# Patient Record
Sex: MALE | Race: WHITE | NOT HISPANIC OR LATINO | Employment: OTHER | URBAN - METROPOLITAN AREA
[De-identification: names, ages, dates, MRNs, and addresses within clinical notes are randomized per-mention and may not be internally consistent; named-entity substitution may affect disease eponyms.]

---

## 2017-05-04 PROBLEM — R60.0 BILATERAL LOWER EXTREMITY EDEMA: Status: ACTIVE | Noted: 2017-05-04

## 2017-05-26 DIAGNOSIS — M25.571 ACUTE RIGHT ANKLE PAIN: Primary | ICD-10-CM

## 2017-05-30 ENCOUNTER — OFFICE VISIT (OUTPATIENT)
Dept: ORTHOPEDICS | Facility: CLINIC | Age: 72
End: 2017-05-30
Payer: MEDICARE

## 2017-05-30 ENCOUNTER — HOSPITAL ENCOUNTER (OUTPATIENT)
Dept: RADIOLOGY | Facility: HOSPITAL | Age: 72
Discharge: HOME | End: 2017-05-30
Attending: ORTHOPAEDIC SURGERY | Admitting: ORTHOPAEDIC SURGERY
Payer: MEDICARE

## 2017-05-30 ENCOUNTER — HOSPITAL ENCOUNTER (OUTPATIENT)
Dept: RADIOLOGY | Facility: HOSPITAL | Age: 72
Discharge: HOME | End: 2017-05-30
Attending: ORTHOPAEDIC SURGERY
Payer: MEDICARE

## 2017-05-30 VITALS
SYSTOLIC BLOOD PRESSURE: 135 MMHG | BODY MASS INDEX: 29.4 KG/M2 | HEART RATE: 69 BPM | WEIGHT: 210 LBS | DIASTOLIC BLOOD PRESSURE: 70 MMHG | HEIGHT: 71 IN

## 2017-05-30 DIAGNOSIS — M25.572 ACUTE LEFT ANKLE PAIN: ICD-10-CM

## 2017-05-30 DIAGNOSIS — M19.079 ANKLE ARTHRITIS: ICD-10-CM

## 2017-05-30 DIAGNOSIS — M19.071 OSTEOARTHRITIS OF RIGHT SUBTALAR JOINT: Primary | ICD-10-CM

## 2017-05-30 DIAGNOSIS — M25.571 ACUTE RIGHT ANKLE PAIN: ICD-10-CM

## 2017-05-30 DIAGNOSIS — M25.572 ACUTE LEFT ANKLE PAIN: Primary | ICD-10-CM

## 2017-05-30 PROCEDURE — 99212 OFFICE O/P EST SF 10 MIN: CPT | Mod: PBBFAC,25,PO | Performed by: ORTHOPAEDIC SURGERY

## 2017-05-30 PROCEDURE — 20600 DRAIN/INJ JOINT/BURSA W/O US: CPT | Mod: PBBFAC,PO | Performed by: ORTHOPAEDIC SURGERY

## 2017-05-30 PROCEDURE — 73610 X-RAY EXAM OF ANKLE: CPT | Mod: 26,RT,, | Performed by: RADIOLOGY

## 2017-05-30 PROCEDURE — 73610 X-RAY EXAM OF ANKLE: CPT | Mod: 26,LT,, | Performed by: RADIOLOGY

## 2017-05-30 PROCEDURE — 99204 OFFICE O/P NEW MOD 45 MIN: CPT | Mod: S$PBB,25,, | Performed by: ORTHOPAEDIC SURGERY

## 2017-05-30 PROCEDURE — 99999 PR PBB SHADOW E&M-EST. PATIENT-LVL II: CPT | Mod: PBBFAC,,, | Performed by: ORTHOPAEDIC SURGERY

## 2017-05-30 PROCEDURE — 20605 DRAIN/INJ JOINT/BURSA W/O US: CPT | Mod: PBBFAC,PO | Performed by: ORTHOPAEDIC SURGERY

## 2017-05-30 RX ORDER — TRIAMCINOLONE ACETONIDE 40 MG/ML
40 INJECTION, SUSPENSION INTRA-ARTICULAR; INTRAMUSCULAR
Status: DISCONTINUED | OUTPATIENT
Start: 2017-05-30 | End: 2017-05-30 | Stop reason: HOSPADM

## 2017-05-30 RX ADMIN — TRIAMCINOLONE ACETONIDE 40 MG: 40 INJECTION, SUSPENSION INTRA-ARTICULAR; INTRAMUSCULAR at 08:05

## 2017-05-30 NOTE — LETTER
May 30, 2017      Barry Hurst MD  [address] [address]  [address]  [facility] [address]           [facility]  [address] [facility] [address] [place] [address]  Phone: [phone]          Patient: Adam Cabrera   MR Number: [id number]   YOB: 1945   Date of Visit: 5/30/2017       Dear Dr. Barry Hurst:    Thank you for referring Adam Cabrera to me for evaluation. Attached you will find relevant portions of my assessment and plan of care.    If you have questions, please do not hesitate to call me. I look forward to following Adam Cabrera along with you.    Sincerely,    Alexander Kohler MD    Enclosure  CC:  No Recipients    If you would like to receive this communication electronically, please contact [email or web address] or [phone] to request more information on [facility] access.    For providers and/or their staff who would like to refer a patient to [facility], please contact us through our one-stop-shop provider referral line, [facility], at [phone].    If you feel you have received this communication in error or would no longer like to receive these types of communications, please e-mail [email or web address]

## 2017-05-31 NOTE — PROCEDURES
Intermediate Joint Aspiration/Injection  Date/Time: 5/30/2017 8:34 PM  Performed by: KODY BAKER  Authorized by: KODY BAKER     Consent Done?:  Yes (Verbal)  Indications:  Pain and joint swelling  Site marked: The procedure site was marked      Location:  Ankle  Site:  R ankle  Prep: Patient was prepped and draped in usual sterile fashion    Ultrasonic Guidance for needle placement: No  Needle size:  22 G  Approach:  Anteromedial  Medications:  40 mg triamcinolone acetonide 40 mg/mL  Patient tolerance:  Patient tolerated the procedure well with no immediate complications

## 2017-05-31 NOTE — PROCEDURES
Small Joint Aspiration/Injection  Date/Time: 5/30/2017 8:32 PM  Performed by: KODY BAKER  Authorized by: KODY BAKER     Consent Done?:  Yes (Verbal)  Indications:  Pain  Site marked: The procedure site was marked      Location:  Foot  Site:  R subtalar  Prep: Patient was prepped and draped in usual sterile fashion    Needle size:  22 G  Medications:  40 mg triamcinolone acetonide 40 mg/mL  Patient tolerance:  Patient tolerated the procedure well with no immediate complications

## 2017-05-31 NOTE — PROGRESS NOTES
"HPI: Adam Cabrera is a 71 y.o. male who was referred to me by Dr. Barry Hurst and was seen in consultation today for  Bilateral ankle pain.  No history of injury or trauma. The pain started about 2 years ago and has been getting worse. He had an MRI done on the right that I reviewed which showed mild  degenerative changes of the ankle and hindfoot as well as some tendonitis of the achilles. He had an injection done in the ankle that didn't help. 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     PAST MEDICAL/SURGICAL/FAMILY/SOCIAL/ HISTORY: REVIEWED    ALLERGIES/MEDICATIONS: REVIEWED       Review of Systems:     Constitution: Negative.   HEENT: Negative.   Eyes: Negative.   Cardiovascular: Negative.   Respiratory: Negative.   Endocrine: Negative.   Hematologic/Lymphatic: Negative.   Skin: Negative.   Musculoskeletal: Positive for bilateral ankle pain   Gastrointestinal: Negative.   Genitourinary: Negative.   Neurological: Negative.   Psychiatric/Behavioral: Negative.   Allergic/Immunologic: Negative.       PHYSICAL EXAM:  Vitals:    05/30/17 0942   BP: 135/70   Pulse: 69     Ht Readings from Last 1 Encounters:   05/30/17 5' 11" (1.803 m)     Wt Readings from Last 1 Encounters:   05/30/17 95.3 kg (210 lb)         GENERAL: Well developed, well nourished, no acute distress.  SKIN: Skin is intact. No atrophy, abrasions or lesions are noted.   Neurological: Normal mental status. Appropriate and conversant. Alert and oriented x 3.  GAIT: Walks with an antalgic gait.    Right lower extremity:  1+ dorsalis pedis pulse.  Capillary refill < 3 seconds.  Very limited range of motion tibiotalar and subtalar joints. Normal alignment of the forefoot and the hindfoot.  5/5 strength EHL, FHL, tibialis anterior, 1/5 gastrocsoleus, 5/5 tibialis posterior and peroneals. Sensation to light touch intact sural, saphenous, superficial peroneal and deep peroneal nerves. " No swelling, ecchymosis or deformity. No lymphadenopathy, no masses or tumors palpated.   tenderness to palpation at the lateral ankle and the subtalar joint.     Left lower extremity: 1+ dorsalis pedis pulse.  Capillary refill < 3 seconds.  Very limited range of motion tibiotalar and subtalar joints. Normal alignment of the forefoot and the hindfoot.  5/5 strength EHL, FHL, tibialis anterior, 1/5 gastrocsoleus,5/5  tibialis posterior and peroneals. Sensation to light touch intact sural, saphenous, superficial peroneal and deep peroneal nerves. No swelling, ecchymosis or deformity. No lymphadenopathy, no masses or tumors palpated.   tenderness to palpation at the lateral ankle and the subtalar joint.       XRAYS:   3 views of bilateral ankles  reviewed today reveal mild  degenerative changes.       ASSESSMENT:        Encounter Diagnoses   Name Primary?    Osteoarthritis of right subtalar joint Yes    Ankle arthritis         PLAN:  I spent 20 minutes in consulation with the patient today. More than half the time was spent counseling the patient on his condition and the options for operative versus non-operative care.  I injected the ankle and subtalar joints today. I agree with Dr. Hurst and I think most of this pain is due to peripheral neuropathy from his back. If injections help I can repeat injections every 4-6 months or prn as needed.    If not I referred him to see Dr. Mon or Dr. Koenig for his neck and back.

## 2017-06-05 ENCOUNTER — TELEPHONE (OUTPATIENT)
Dept: UROLOGY | Facility: CLINIC | Age: 72
End: 2017-06-05

## 2017-06-05 NOTE — TELEPHONE ENCOUNTER
----- Message from Tia Luna sent at 6/5/2017 10:15 AM CDT -----  Contact: wife/Dorothy/[phone]  Patients wife states that she need to know if patient need to schedule an appointment stating that he seen him in his old location.  Call Dorothy at [phone].

## 2017-06-05 NOTE — TELEPHONE ENCOUNTER
Left message to let pt know he is due for an annual visit at the end of July and he is established.

## 2017-07-24 ENCOUNTER — OFFICE VISIT (OUTPATIENT)
Dept: UROLOGY | Facility: CLINIC | Age: 72
End: 2017-07-24
Payer: MEDICARE

## 2017-07-24 ENCOUNTER — LAB VISIT (OUTPATIENT)
Dept: LAB | Facility: HOSPITAL | Age: 72
End: 2017-07-24
Attending: UROLOGY
Payer: MEDICARE

## 2017-07-24 VITALS
BODY MASS INDEX: 28.67 KG/M2 | SYSTOLIC BLOOD PRESSURE: 144 MMHG | WEIGHT: 204.81 LBS | HEART RATE: 68 BPM | HEIGHT: 71 IN | DIASTOLIC BLOOD PRESSURE: 69 MMHG

## 2017-07-24 DIAGNOSIS — N40.1 HYPERTROPHY OF PROSTATE WITH URINARY OBSTRUCTION AND OTHER LOWER URINARY TRACT SYMPTOMS (LUTS): Primary | ICD-10-CM

## 2017-07-24 DIAGNOSIS — Z12.5 SCREENING FOR PROSTATE CANCER: ICD-10-CM

## 2017-07-24 DIAGNOSIS — N13.8 HYPERTROPHY OF PROSTATE WITH URINARY OBSTRUCTION AND OTHER LOWER URINARY TRACT SYMPTOMS (LUTS): Primary | ICD-10-CM

## 2017-07-24 LAB
BILIRUB SERPL-MCNC: NORMAL MG/DL
BLOOD URINE, POC: NORMAL
COLOR, POC UA: YELLOW
COMPLEXED PSA SERPL-MCNC: 0.73 NG/ML
GLUCOSE UR QL STRIP: NORMAL
KETONES UR QL STRIP: NORMAL
LEUKOCYTE ESTERASE URINE, POC: NORMAL
NITRITE, POC UA: NORMAL
PH, POC UA: 5
PROTEIN, POC: NORMAL
SPECIFIC GRAVITY, POC UA: 1.02
UROBILINOGEN, POC UA: NORMAL

## 2017-07-24 PROCEDURE — 99214 OFFICE O/P EST MOD 30 MIN: CPT | Mod: S$PBB,,, | Performed by: UROLOGY

## 2017-07-24 PROCEDURE — 99999 PR PBB SHADOW E&M-EST. PATIENT-LVL III: CPT | Mod: PBBFAC,,, | Performed by: UROLOGY

## 2017-07-24 PROCEDURE — 36415 COLL VENOUS BLD VENIPUNCTURE: CPT | Mod: PO

## 2017-07-24 PROCEDURE — 1125F AMNT PAIN NOTED PAIN PRSNT: CPT | Mod: ,,, | Performed by: UROLOGY

## 2017-07-24 PROCEDURE — 84153 ASSAY OF PSA TOTAL: CPT

## 2017-07-24 PROCEDURE — 1159F MED LIST DOCD IN RCRD: CPT | Mod: ,,, | Performed by: UROLOGY

## 2017-07-24 RX ORDER — TAMSULOSIN HYDROCHLORIDE 0.4 MG/1
0.4 CAPSULE ORAL DAILY
Qty: 30 CAPSULE | Refills: 11 | Status: ON HOLD | OUTPATIENT
Start: 2017-07-24 | End: 2019-05-02 | Stop reason: ALTCHOICE

## 2017-07-24 RX ORDER — PREGABALIN 50 MG/1
50 CAPSULE ORAL 2 TIMES DAILY
COMMUNITY
Start: 2017-07-19 | End: 2018-07-26

## 2017-07-24 RX ORDER — LATANOPROST 50 UG/ML
1 SOLUTION/ DROPS OPHTHALMIC NIGHTLY
COMMUNITY

## 2017-07-24 RX ORDER — FINASTERIDE 5 MG/1
5 TABLET, FILM COATED ORAL DAILY
Qty: 30 TABLET | Refills: 12 | Status: SHIPPED | OUTPATIENT
Start: 2017-07-24 | End: 2017-09-13

## 2017-07-24 NOTE — PROGRESS NOTES
Subjective:       Patient ID: Adam Cabrera is a 72 y.o. male.    Chief Complaint: Follow-up    HPI     72 year old with ED likely due to vascular disease. He is taking Levitra and this is less effective than it was last year.  Now has SL Ntg.  He is not interested in other treatment options.  His most recent PSA is 0.6 and unchanged from previous year.  His urinary symptoms have gotten worse over the last year.  He stopped taking Flomax and Finasteride for unknown reason.  He has has worsening back pain and neuropathy in the last year.  He complains of hesitancy, frequency and nocturia.  He was on testosterone replacement for a while but he had minimal improvement in his symptoms and testosterone has been held. He had MI 2013.   Urine dipstick shows negative for all components.      Review of Systems   Constitutional: Negative for fever.   Genitourinary: Negative for dysuria and hematuria.       Objective:      Physical Exam   Constitutional: He is oriented to person, place, and time. He appears well-developed and well-nourished.   HENT:   Head: Normocephalic and atraumatic.   Eyes: Conjunctivae are normal.   Cardiovascular: Normal rate.    Pulmonary/Chest: Effort normal.   Genitourinary: Rectal exam shows no mass and anal tone normal. Prostate is enlarged (40g s/s/a). Prostate is not tender.   Musculoskeletal: Normal range of motion.   Neurological: He is alert and oriented to person, place, and time.   Skin: Skin is warm and dry. No rash noted.   Psychiatric: He has a normal mood and affect.   Vitals reviewed.      Assessment:       1. Hypertrophy of prostate with urinary obstruction and other lower urinary tract symptoms (LUTS)    2. Screening for prostate cancer        Plan:       Hypertrophy of prostate with urinary obstruction and other lower urinary tract symptoms (LUTS)  -     POCT URINE DIPSTICK WITHOUT MICROSCOPE  -     tamsulosin (FLOMAX) 0.4 mg Cp24; Take 1 capsule (0.4 mg total) by mouth once  daily.  Dispense: 30 capsule; Refill: 11  -     finasteride (PROSCAR) 5 mg tablet; Take 1 tablet (5 mg total) by mouth once daily.  Dispense: 30 tablet; Refill: 12    Screening for prostate cancer  -     PSA, Screening; Future; Expected date: 07/24/2017      Resume Flomax and Finasteride.  RTC 12 months

## 2017-07-25 ENCOUNTER — TELEPHONE (OUTPATIENT)
Dept: UROLOGY | Facility: CLINIC | Age: 72
End: 2017-07-25

## 2017-07-25 NOTE — TELEPHONE ENCOUNTER
----- Message from KATHRYN Thompson MD sent at 7/25/2017  4:25 PM CDT -----  Call with PSA result.  Very good.

## 2017-09-13 PROBLEM — I20.0 UNSTABLE ANGINA PECTORIS: Status: ACTIVE | Noted: 2017-09-13

## 2017-09-18 PROBLEM — J41.0 SIMPLE CHRONIC BRONCHITIS: Status: ACTIVE | Noted: 2017-09-18

## 2017-09-18 PROBLEM — F51.01 PRIMARY INSOMNIA: Status: ACTIVE | Noted: 2017-09-18

## 2017-11-22 PROBLEM — I25.84 CORONARY ARTERY DISEASE DUE TO CALCIFIED CORONARY LESION: Status: ACTIVE | Noted: 2017-11-22

## 2017-11-22 PROBLEM — I25.10 CORONARY ARTERY DISEASE DUE TO CALCIFIED CORONARY LESION: Status: ACTIVE | Noted: 2017-11-22

## 2018-07-26 ENCOUNTER — OFFICE VISIT (OUTPATIENT)
Dept: UROLOGY | Facility: CLINIC | Age: 73
End: 2018-07-26
Payer: MEDICARE

## 2018-07-26 ENCOUNTER — LAB VISIT (OUTPATIENT)
Dept: LAB | Facility: HOSPITAL | Age: 73
End: 2018-07-26
Attending: UROLOGY
Payer: MEDICARE

## 2018-07-26 VITALS
SYSTOLIC BLOOD PRESSURE: 142 MMHG | HEART RATE: 75 BPM | HEIGHT: 71 IN | DIASTOLIC BLOOD PRESSURE: 69 MMHG | WEIGHT: 213.38 LBS | BODY MASS INDEX: 29.87 KG/M2

## 2018-07-26 DIAGNOSIS — N40.1 BENIGN PROSTATIC HYPERPLASIA WITH LOWER URINARY TRACT SYMPTOMS, SYMPTOM DETAILS UNSPECIFIED: Primary | ICD-10-CM

## 2018-07-26 DIAGNOSIS — Z12.5 SCREENING FOR PROSTATE CANCER: ICD-10-CM

## 2018-07-26 LAB
BILIRUB SERPL-MCNC: NORMAL MG/DL
BLOOD URINE, POC: NORMAL
COLOR, POC UA: YELLOW
COMPLEXED PSA SERPL-MCNC: 0.63 NG/ML
GLUCOSE UR QL STRIP: NORMAL
KETONES UR QL STRIP: NORMAL
LEUKOCYTE ESTERASE URINE, POC: NORMAL
NITRITE, POC UA: NORMAL
PH, POC UA: 6.5
PROTEIN, POC: NORMAL
SPECIFIC GRAVITY, POC UA: 1.02
UROBILINOGEN, POC UA: NORMAL

## 2018-07-26 PROCEDURE — 99214 OFFICE O/P EST MOD 30 MIN: CPT | Mod: S$PBB,,, | Performed by: UROLOGY

## 2018-07-26 PROCEDURE — 84153 ASSAY OF PSA TOTAL: CPT

## 2018-07-26 PROCEDURE — 99213 OFFICE O/P EST LOW 20 MIN: CPT | Mod: PBBFAC,PO | Performed by: UROLOGY

## 2018-07-26 PROCEDURE — 99999 PR PBB SHADOW E&M-EST. PATIENT-LVL III: CPT | Mod: PBBFAC,,, | Performed by: UROLOGY

## 2018-07-26 PROCEDURE — 36415 COLL VENOUS BLD VENIPUNCTURE: CPT | Mod: PO

## 2018-07-26 PROCEDURE — 81002 URINALYSIS NONAUTO W/O SCOPE: CPT | Mod: PBBFAC,PO | Performed by: UROLOGY

## 2018-07-26 NOTE — PROGRESS NOTES
Subjective:       Patient ID: Adam Cabrera is a 73 y.o. male.    Chief Complaint: Annual Exam    HPI     73 year old BPH.  He was taking Flomax and Finasteride but has since stopped Finasteride as he could no tolerate.  His last PSA is 0.73 and unchanged from previous year.  His urinary symptoms are stable.  He still has bothersome symptoms but is not interested in other treatment for now.  He has has worsening back pain and neuropathy in the last year.  He complains of hesitancy, frequency and nocturia.  He was on testosterone replacement for a while but he had minimal improvement in his symptoms and testosterone has been held. He had MI 2013.   Urine dipstick shows negative for all components.    Review of Systems   Constitutional: Negative for fever.   Genitourinary: Negative for dysuria and hematuria.       Objective:      Physical Exam   Constitutional: He is oriented to person, place, and time. He appears well-developed and well-nourished.   HENT:   Head: Normocephalic and atraumatic.   Eyes: Conjunctivae are normal.   Cardiovascular: Normal rate.    Pulmonary/Chest: Effort normal.   Genitourinary: Rectal exam shows no mass and anal tone normal. Prostate is enlarged (40g, s/s/a). Prostate is not tender.   Musculoskeletal: Normal range of motion. He exhibits no edema.   Neurological: He is alert and oriented to person, place, and time.   Skin: Skin is warm and dry. No rash noted.   Psychiatric: He has a normal mood and affect.   Vitals reviewed.      Assessment:       1. Benign prostatic hyperplasia with lower urinary tract symptoms, symptom details unspecified    2. Screening for prostate cancer        Plan:       Benign prostatic hyperplasia with lower urinary tract symptoms, symptom details unspecified  -     POCT URINE DIPSTICK WITHOUT MICROSCOPE    Screening for prostate cancer  -     PSA, Screening; Future; Expected date: 07/26/2018      RTC 1 year,  Continue Flomax

## 2018-07-27 ENCOUNTER — TELEPHONE (OUTPATIENT)
Dept: UROLOGY | Facility: CLINIC | Age: 73
End: 2018-07-27

## 2018-07-27 NOTE — TELEPHONE ENCOUNTER
----- Message from Roxy Michele sent at 7/27/2018  4:07 PM CDT -----  Contact: self   Patient miss call from your office please call back at [phone] (ztlb)

## 2019-05-08 PROBLEM — R53.82 CHRONIC FATIGUE: Status: ACTIVE | Noted: 2019-05-08

## 2019-05-08 PROBLEM — R45.89 ANXIETY ABOUT HEALTH: Status: ACTIVE | Noted: 2019-05-08

## 2019-06-27 PROBLEM — G47.30 SLEEP APNEA WITH USE OF CONTINUOUS POSITIVE AIRWAY PRESSURE (CPAP): Status: ACTIVE | Noted: 2019-06-27

## 2019-07-29 ENCOUNTER — LAB VISIT (OUTPATIENT)
Dept: LAB | Facility: HOSPITAL | Age: 74
End: 2019-07-29
Attending: UROLOGY
Payer: MEDICARE

## 2019-07-29 ENCOUNTER — OFFICE VISIT (OUTPATIENT)
Dept: UROLOGY | Facility: CLINIC | Age: 74
End: 2019-07-29
Payer: MEDICARE

## 2019-07-29 VITALS
WEIGHT: 204.81 LBS | HEIGHT: 71 IN | SYSTOLIC BLOOD PRESSURE: 152 MMHG | DIASTOLIC BLOOD PRESSURE: 84 MMHG | BODY MASS INDEX: 28.67 KG/M2 | HEART RATE: 75 BPM

## 2019-07-29 DIAGNOSIS — N40.1 BENIGN PROSTATIC HYPERPLASIA WITH LOWER URINARY TRACT SYMPTOMS, SYMPTOM DETAILS UNSPECIFIED: Primary | ICD-10-CM

## 2019-07-29 DIAGNOSIS — Z12.5 SCREENING FOR PROSTATE CANCER: ICD-10-CM

## 2019-07-29 LAB
BILIRUB SERPL-MCNC: NORMAL MG/DL
BLOOD URINE, POC: NORMAL
COLOR, POC UA: NORMAL
COMPLEXED PSA SERPL-MCNC: 0.76 NG/ML (ref 0–4)
GLUCOSE UR QL STRIP: NORMAL
KETONES UR QL STRIP: NORMAL
LEUKOCYTE ESTERASE URINE, POC: NORMAL
NITRITE, POC UA: NORMAL
PH, POC UA: 6.5
PROTEIN, POC: NORMAL
SPECIFIC GRAVITY, POC UA: 1025
UROBILINOGEN, POC UA: NORMAL

## 2019-07-29 PROCEDURE — 99999 PR PBB SHADOW E&M-EST. PATIENT-LVL III: ICD-10-PCS | Mod: PBBFAC,,, | Performed by: UROLOGY

## 2019-07-29 PROCEDURE — 99213 OFFICE O/P EST LOW 20 MIN: CPT | Mod: PBBFAC,PO | Performed by: UROLOGY

## 2019-07-29 PROCEDURE — 81002 URINALYSIS NONAUTO W/O SCOPE: CPT | Mod: PBBFAC,PO | Performed by: UROLOGY

## 2019-07-29 PROCEDURE — 36415 COLL VENOUS BLD VENIPUNCTURE: CPT | Mod: PO

## 2019-07-29 PROCEDURE — 99213 OFFICE O/P EST LOW 20 MIN: CPT | Mod: S$PBB,,, | Performed by: UROLOGY

## 2019-07-29 PROCEDURE — 99999 PR PBB SHADOW E&M-EST. PATIENT-LVL III: CPT | Mod: PBBFAC,,, | Performed by: UROLOGY

## 2019-07-29 PROCEDURE — 99213 PR OFFICE/OUTPT VISIT, EST, LEVL III, 20-29 MIN: ICD-10-PCS | Mod: S$PBB,,, | Performed by: UROLOGY

## 2019-07-29 PROCEDURE — 84153 ASSAY OF PSA TOTAL: CPT

## 2019-07-29 RX ORDER — HYDROCHLOROTHIAZIDE 50 MG/1
TABLET ORAL
Refills: 11 | COMMUNITY
Start: 2019-07-23 | End: 2019-12-30

## 2019-07-29 NOTE — PROGRESS NOTES
Subjective:       Patient ID: Adam Cabrera is a 74 y.o. male.    Chief Complaint: Annual Exam    HPI     74 year old BPH.  He is taking Flomax and is overall doing well.  At 1 time he was also taking Finasteride but could not tolerate.  His last PSA is 0.63 and unchanged from previous year.  His urinary symptoms are stable.  He still has bothersome symptoms but is not interested in other treatment for now.  He was on testosterone replacement for a while but he had minimal improvement in his symptoms and testosterone has been held.   Urine dipstick shows negative for all components.    Component PSA, SCREEN   Latest Ref Rng & Units 0.00 - 4.00 ng/mL   7/26/2018 0.63   7/24/2017 0.73       Review of Systems   Constitutional: Negative for fever.   Genitourinary: Negative for dysuria and hematuria.       Objective:      Physical Exam   Constitutional: He is oriented to person, place, and time. He appears well-developed and well-nourished.   HENT:   Head: Normocephalic and atraumatic.   Eyes: Conjunctivae are normal.   Cardiovascular: Normal rate.   Pulmonary/Chest: Effort normal.   Genitourinary: Rectal exam shows no mass and anal tone normal. Prostate is enlarged (30g, s/s/a). Prostate is not tender.   Musculoskeletal: Normal range of motion.   Neurological: He is alert and oriented to person, place, and time.   Skin: Skin is warm and dry. No rash noted.   Psychiatric: He has a normal mood and affect.   Vitals reviewed.      Assessment:       1. Benign prostatic hyperplasia with lower urinary tract symptoms, symptom details unspecified    2. Screening for prostate cancer        Plan:       Benign prostatic hyperplasia with lower urinary tract symptoms, symptom details unspecified  -     POCT URINE DIPSTICK WITHOUT MICROSCOPE    Screening for prostate cancer  -     PSA, Screening; Future; Expected date: 07/29/2019

## 2020-09-18 PROBLEM — I20.0 UNSTABLE ANGINA: Status: ACTIVE | Noted: 2020-09-18

## 2021-01-04 ENCOUNTER — TELEPHONE (OUTPATIENT)
Dept: UROLOGY | Facility: CLINIC | Age: 76
End: 2021-01-04

## 2021-01-06 ENCOUNTER — LAB VISIT (OUTPATIENT)
Dept: LAB | Facility: HOSPITAL | Age: 76
End: 2021-01-06
Attending: UROLOGY
Payer: MEDICARE

## 2021-01-06 ENCOUNTER — OFFICE VISIT (OUTPATIENT)
Dept: UROLOGY | Facility: CLINIC | Age: 76
End: 2021-01-06
Payer: MEDICARE

## 2021-01-06 VITALS
BODY MASS INDEX: 29.97 KG/M2 | SYSTOLIC BLOOD PRESSURE: 171 MMHG | HEART RATE: 86 BPM | DIASTOLIC BLOOD PRESSURE: 83 MMHG | HEIGHT: 71 IN | WEIGHT: 214.06 LBS

## 2021-01-06 DIAGNOSIS — N13.8 ENLARGED PROSTATE WITH URINARY OBSTRUCTION: Primary | ICD-10-CM

## 2021-01-06 DIAGNOSIS — Z12.5 SCREENING FOR PROSTATE CANCER: ICD-10-CM

## 2021-01-06 DIAGNOSIS — N40.1 ENLARGED PROSTATE WITH URINARY OBSTRUCTION: Primary | ICD-10-CM

## 2021-01-06 LAB — COMPLEXED PSA SERPL-MCNC: 0.68 NG/ML (ref 0–4)

## 2021-01-06 PROCEDURE — 36415 COLL VENOUS BLD VENIPUNCTURE: CPT | Mod: PO

## 2021-01-06 PROCEDURE — 99214 OFFICE O/P EST MOD 30 MIN: CPT | Mod: S$PBB,,, | Performed by: UROLOGY

## 2021-01-06 PROCEDURE — 99999 PR PBB SHADOW E&M-EST. PATIENT-LVL IV: CPT | Mod: PBBFAC,,, | Performed by: UROLOGY

## 2021-01-06 PROCEDURE — 84153 ASSAY OF PSA TOTAL: CPT

## 2021-01-06 PROCEDURE — 99214 OFFICE O/P EST MOD 30 MIN: CPT | Mod: PBBFAC,PO | Performed by: UROLOGY

## 2021-01-06 PROCEDURE — 99214 PR OFFICE/OUTPT VISIT, EST, LEVL IV, 30-39 MIN: ICD-10-PCS | Mod: S$PBB,,, | Performed by: UROLOGY

## 2021-01-06 PROCEDURE — 99999 PR PBB SHADOW E&M-EST. PATIENT-LVL IV: ICD-10-PCS | Mod: PBBFAC,,, | Performed by: UROLOGY

## 2021-01-06 RX ORDER — DOCUSATE SODIUM 100 MG
CAPSULE ORAL
COMMUNITY
Start: 2020-12-21 | End: 2024-02-03 | Stop reason: CLARIF

## 2021-01-06 RX ORDER — TAMSULOSIN HYDROCHLORIDE 0.4 MG/1
0.4 CAPSULE ORAL DAILY
Qty: 30 CAPSULE | Refills: 11 | Status: SHIPPED | OUTPATIENT
Start: 2021-01-06 | End: 2021-03-16

## 2021-01-06 RX ORDER — AMITRIPTYLINE HYDROCHLORIDE 25 MG/1
TABLET, FILM COATED ORAL
COMMUNITY
Start: 2020-11-23 | End: 2021-03-16

## 2021-04-02 PROBLEM — N40.1 ENLARGED PROSTATE WITH URINARY OBSTRUCTION: Status: ACTIVE | Noted: 2021-04-02

## 2021-04-02 PROBLEM — N13.8 ENLARGED PROSTATE WITH URINARY OBSTRUCTION: Status: ACTIVE | Noted: 2021-04-02

## 2021-04-02 PROBLEM — I20.0 UNSTABLE ANGINA: Status: RESOLVED | Noted: 2020-09-18 | Resolved: 2021-04-02

## 2021-04-02 PROBLEM — M15.9 PRIMARY OSTEOARTHRITIS INVOLVING MULTIPLE JOINTS: Status: ACTIVE | Noted: 2021-04-02

## 2023-08-30 ENCOUNTER — OFFICE VISIT (OUTPATIENT)
Dept: URGENT CARE | Facility: CLINIC | Age: 78
End: 2023-08-30
Payer: MEDICARE

## 2023-08-30 VITALS
SYSTOLIC BLOOD PRESSURE: 102 MMHG | DIASTOLIC BLOOD PRESSURE: 46 MMHG | HEIGHT: 71 IN | HEART RATE: 76 BPM | OXYGEN SATURATION: 96 % | RESPIRATION RATE: 18 BRPM | WEIGHT: 213 LBS | TEMPERATURE: 98 F | BODY MASS INDEX: 29.82 KG/M2

## 2023-08-30 DIAGNOSIS — R09.81 NASAL CONGESTION: Primary | ICD-10-CM

## 2023-08-30 DIAGNOSIS — R03.1 LOW BLOOD PRESSURE READING: ICD-10-CM

## 2023-08-30 DIAGNOSIS — Z51.89 VISIT FOR WOUND CHECK: ICD-10-CM

## 2023-08-30 LAB
CTP QC/QA: YES
SARS-COV-2 AG RESP QL IA.RAPID: NEGATIVE

## 2023-08-30 PROCEDURE — 87811 SARS-COV-2 COVID19 W/OPTIC: CPT | Mod: QW,S$GLB,, | Performed by: PHYSICIAN ASSISTANT

## 2023-08-30 PROCEDURE — 99203 PR OFFICE/OUTPT VISIT, NEW, LEVL III, 30-44 MIN: ICD-10-PCS | Mod: S$GLB,,, | Performed by: PHYSICIAN ASSISTANT

## 2023-08-30 PROCEDURE — 87811 SARS CORONAVIRUS 2 ANTIGEN POCT, MANUAL READ: ICD-10-PCS | Mod: QW,S$GLB,, | Performed by: PHYSICIAN ASSISTANT

## 2023-08-30 PROCEDURE — 99203 OFFICE O/P NEW LOW 30 MIN: CPT | Mod: S$GLB,,, | Performed by: PHYSICIAN ASSISTANT

## 2023-08-30 NOTE — PROGRESS NOTES
"Subjective:      Patient ID: Adam Cabrera is a 78 y.o. male.    Vitals:  height is 5' 11" (1.803 m) and weight is 96.6 kg (213 lb). His oral temperature is 98.4 °F (36.9 °C). His blood pressure is 102/46 (abnormal) and his pulse is 76. His respiration is 18 and oxygen saturation is 96%.     Chief Complaint: Wound Check    Pt. Is present in clinic on today with complaint's of a head cold that started 2 days ago his Symptoms are runny nose and congestion.He's taking Coricidin HBP for the cold. He also had a fall 2 day's ago that caused him to have staples placed in his head the ER instructed him to follow up with us for his wound check. He denies any pain at this time.     Wound Check  He was originally treated 2 to 3 days ago. Previous treatment included wound cleansing or irrigation and laceration repair. The maximum temperature noted was less than 100.4 F. The temperature was taken using an oral thermometer. There has been no drainage from the wound. The redness has not changed. The swelling has not changed. There is no pain (pain when touched) present. He has no difficulty moving the affected extremity or digit.       Constitution: Negative for chills and fever.   HENT:  Positive for congestion and postnasal drip.    Respiratory:  Negative for cough and shortness of breath.       Objective:     Physical Exam   Constitutional: He does not appear ill. No distress.   HENT:   Head:      Comments: Fourteen staples noted to right side of scalp.  Wound appears to be healing well.  Ears:   Right Ear: External ear normal.   Left Ear: External ear normal.   Nose: Nose normal.   Eyes: Conjunctivae are normal. Right eye exhibits no discharge. Left eye exhibits no discharge. Extraocular movement intact   Cardiovascular: Normal rate, regular rhythm and normal heart sounds.   No murmur heard.  Pulmonary/Chest: Effort normal and breath sounds normal. He has no wheezes. He has no rhonchi. He has no rales.   Abdominal: " Normal appearance.   Musculoskeletal: Normal range of motion.         General: Normal range of motion.   Neurological: He is alert.   Skin: Skin is warm, dry and not pale. jaundice  Psychiatric: His behavior is normal. Mood, judgment and thought content normal.   Nursing note and vitals reviewed.      Assessment:     1. Nasal congestion    2. Visit for wound check    3. Low blood pressure reading        Plan:       Nasal congestion  -     SARS Coronavirus 2 Antigen, POCT Manual Read    Results for orders placed or performed in visit on 08/30/23   SARS Coronavirus 2 Antigen, POCT Manual Read   Result Value Ref Range    SARS Coronavirus 2 Antigen Negative Negative     Acceptable Yes         Visit for wound check    Wound appears to be healing well.  Staples were placed 2 days ago.  Discussed wound care.  Discussed would return around September 5th for removal of sutures.    Low blood pressure reading      Discussed monitoring keep a log of blood pressures at home.  If they continue to stay low should contact cardiologist 1st thing on Monday.  Patient and wife voiced understanding and agree with plan.    Patient Instructions   You must understand that you've received an Urgent Care treatment only and that you may be released before all your medical problems are known or treated. You, the patient, will arrange for follow up care as instructed.  Follow up with your PCP or specialty clinic as directed in the next 1-2 weeks if not improved or as needed.  You can call [phone] to schedule an appointment with the appropriate provider.  If your condition worsens we recommend that you receive another evaluation at the emergency room immediately or contact your primary medical clinics after hours call service to discuss your concerns.  Please return here or go to the Emergency Department for any concerns or worsening of condition.

## 2023-08-30 NOTE — PATIENT INSTRUCTIONS

## 2023-09-05 ENCOUNTER — OFFICE VISIT (OUTPATIENT)
Dept: URGENT CARE | Facility: CLINIC | Age: 78
End: 2023-09-05
Payer: MEDICARE

## 2023-09-05 VITALS
SYSTOLIC BLOOD PRESSURE: 125 MMHG | DIASTOLIC BLOOD PRESSURE: 63 MMHG | TEMPERATURE: 97 F | RESPIRATION RATE: 16 BRPM | HEART RATE: 75 BPM | OXYGEN SATURATION: 96 %

## 2023-09-05 DIAGNOSIS — Z48.02 VISIT FOR SUTURE REMOVAL: Primary | ICD-10-CM

## 2023-09-05 PROCEDURE — 99499 UNLISTED E&M SERVICE: CPT | Mod: S$GLB,,, | Performed by: NURSE PRACTITIONER

## 2023-09-05 PROCEDURE — 99499 NO LOS: ICD-10-PCS | Mod: S$GLB,,, | Performed by: NURSE PRACTITIONER

## 2023-09-05 NOTE — PROGRESS NOTES
Subjective:      Patient ID: Adam Cabrera is a 78 y.o. male.    Vitals:  temperature is 97.4 °F (36.3 °C). His blood pressure is 125/63 and his pulse is 75. His respiration is 16 and oxygen saturation is 96%.     Chief Complaint: Suture / Staple Removal    Pt presents for staple removal.  Lac to head 8/28/2023.    Provider note begins below:   Patient denies fever or chills. Denies drainage from staple site.    Suture / Staple Removal  The sutures were placed 7 to 10 days ago. He tried antibiotic ointment use since the wound repair. The treatment provided significant relief. His temperature was unmeasured prior to arrival. There has been no drainage from the wound. There is no redness present. There is no swelling present. There is no pain present. He has no difficulty moving the affected extremity or digit.       Constitution: Negative. Negative for chills, fatigue and fever.   HENT:  Negative for ear pain, ear discharge, facial swelling and sinus pressure.    Neck: Negative for neck pain, neck stiffness and painful lymph nodes.   Cardiovascular: Negative.  Negative for chest pain and sob on exertion.   Eyes: Negative.  Negative for eye itching, eye pain and eye redness.   Respiratory: Negative.  Negative for chest tightness, cough, shortness of breath, wheezing and asthma.    Gastrointestinal: Negative.  Negative for abdominal pain, nausea and vomiting.   Endocrine: negative. excessive thirst.   Genitourinary: Negative.  Negative for dysuria, frequency, urgency and flank pain.   Musculoskeletal: Negative.  Negative for pain, trauma, joint pain and joint swelling.   Skin: Negative.  Positive for laceration. Negative for rash, wound, lesion, erythema and hives.   Allergic/Immunologic: Negative.  Negative for eczema, asthma, hives, itching and sneezing.   Neurological: Negative.  Negative for dizziness, passing out, disorientation and altered mental status.   Hematologic/Lymphatic: Negative.  Negative for  swollen lymph nodes.   Psychiatric/Behavioral: Negative.  Negative for altered mental status, disorientation and confusion.       Objective:     Physical Exam   Constitutional: He is oriented to person, place, and time. He appears well-developed.  Non-toxic appearance. He does not appear ill. No distress.   HENT:   Head: Normocephalic and atraumatic. Head is without abrasion, without contusion and without laceration.   Ears:   Right Ear: External ear normal.   Left Ear: External ear normal.   Nose: Nose normal.   Mouth/Throat: Oropharynx is clear and moist and mucous membranes are normal.   Eyes: Conjunctivae, EOM and lids are normal. Pupils are equal, round, and reactive to light.   Neck: Trachea normal and phonation normal. Neck supple.   Cardiovascular: Normal rate, regular rhythm and normal heart sounds.   Pulmonary/Chest: Effort normal and breath sounds normal. No stridor. No respiratory distress.   Abdominal: Normal appearance.   Musculoskeletal: Normal range of motion.         General: Normal range of motion.   Neurological: no focal deficit. He is alert, oriented to person, place, and time and at baseline.   Skin: Skin is warm, dry, intact, not diaphoretic and no rash. Capillary refill takes less than 2 seconds. No abrasion, No burn, No bruising, No erythema and No ecchymosis         Comments: 14 staples removed from head lac w/o difficulty. Wound appears well approximated. No signs of infection noted.   Psychiatric: His speech is normal and behavior is normal. Mood, judgment and thought content normal.   Nursing note and vitals reviewed.      Assessment:     1. Visit for suture removal        Plan:     FOLLOWUP  Follow up if symptoms worsen or fail to improve, for PLEASE CONTACT PCP OR CONTACT THE EMERGENCY ROOM..     PATIENT INSTRUCTIONS  Patient Instructions   INSTRUCTIONS:  - Rest.  - Drink plenty of fluids.  - Take Tylenol and/or Ibuprofen as directed as needed for fever/pain.  Do not take more than the  recommended dose.  - follow up with your PCP within the next 1-2 weeks as needed.  - You must understand that you have received an Urgent Care treatment only and that you may be released before all of your medical problems are known or treated.   - You, the patient, will arrange for follow up care as instructed.   - If your condition worsens or fails to improve we recommend that you receive another evaluation at the ER immediately or contact your PCP to discuss your concerns.   - You can call [phone] or [phone] to help schedule an appointment with the appropriate provider.     -If you smoke cigarettes, it would be beneficial for you to stop.        THANK YOU FOR ALLOWING ME TO PARTICIPATE IN YOUR HEALTHCARE,     Johnnie Zhang, NP   Visit for suture removal

## 2023-09-05 NOTE — PATIENT INSTRUCTIONS

## 2023-10-05 PROBLEM — J45.30 MILD PERSISTENT ASTHMA WITHOUT COMPLICATION: Status: ACTIVE | Noted: 2023-10-05

## 2023-10-05 PROBLEM — J41.0 SIMPLE CHRONIC BRONCHITIS: Status: RESOLVED | Noted: 2017-09-18 | Resolved: 2023-10-05

## 2023-10-05 PROBLEM — R42 DIZZINESS: Status: ACTIVE | Noted: 2023-10-05

## 2023-11-28 ENCOUNTER — TELEPHONE (OUTPATIENT)
Dept: OTOLARYNGOLOGY | Facility: CLINIC | Age: 78
End: 2023-11-28
Payer: COMMERCIAL

## 2023-11-28 NOTE — TELEPHONE ENCOUNTER
----- Message from Ra Bhagat sent at 11/28/2023 10:05 AM CST -----  Regarding: Referral Appt Request  Contact: pt wife  Appointment Request      Caller is requesting an appointment.      Name of Caller: Dorothy         Would the patient rather a call back or a response via [facility]? Call back     Best Call Back Number: [phone]      Additional Information:  Please Advise - Thank you

## 2024-01-01 ENCOUNTER — OUTPATIENT CASE MANAGEMENT (OUTPATIENT)
Dept: ADMINISTRATIVE | Facility: OTHER | Age: 79
End: 2024-01-01
Payer: COMMERCIAL

## 2024-02-03 PROBLEM — R53.1 WEAKNESS GENERALIZED: Status: ACTIVE | Noted: 2024-02-03

## 2024-02-03 PROBLEM — I20.1 PRINZMETAL ANGINA: Status: ACTIVE | Noted: 2023-03-22

## 2024-02-03 PROBLEM — N17.9 AKI (ACUTE KIDNEY INJURY): Status: ACTIVE | Noted: 2024-02-03

## 2024-02-03 PROBLEM — M43.22 CERVICAL VERTEBRAL FUSION: Status: ACTIVE | Noted: 2024-02-03

## 2024-02-03 PROBLEM — N13.8 HYPERTROPHY OF PROSTATE WITH URINARY OBSTRUCTION: Status: ACTIVE | Noted: 2021-04-02

## 2024-02-03 PROBLEM — N40.1 HYPERTROPHY OF PROSTATE WITH URINARY OBSTRUCTION: Status: ACTIVE | Noted: 2021-04-02

## 2024-02-03 PROBLEM — R79.89 ELEVATED TROPONIN: Status: ACTIVE | Noted: 2024-02-03

## 2024-02-04 PROBLEM — Z75.8 DISCHARGE PLANNING ISSUES: Status: ACTIVE | Noted: 2024-02-04

## 2024-02-16 NOTE — PROGRESS NOTES
02/16/24-Patient discharged to [facility] on 02/07/24. [facility]PCM Case Closure.

## 2024-03-06 PROBLEM — I95.9 HYPOTENSION: Status: ACTIVE | Noted: 2024-03-06

## 2024-03-06 PROBLEM — Z71.89 ACP (ADVANCE CARE PLANNING): Status: ACTIVE | Noted: 2024-03-06

## 2024-03-06 PROBLEM — R79.89 ELEVATED TROPONIN I LEVEL: Status: ACTIVE | Noted: 2024-03-06

## 2024-03-08 PROBLEM — E83.39 HYPOPHOSPHATEMIA: Status: ACTIVE | Noted: 2024-03-08

## 2024-03-09 PROBLEM — R53.81 PHYSICAL DECONDITIONING: Status: ACTIVE | Noted: 2024-02-03

## 2024-03-09 PROBLEM — E87.6 HYPOKALEMIA: Status: ACTIVE | Noted: 2024-03-09

## 2024-05-08 PROBLEM — R26.9 GAIT DISORDER: Status: ACTIVE | Noted: 2024-01-01

## 2024-05-24 PROBLEM — R54 AGE-RELATED PHYSICAL DEBILITY: Status: ACTIVE | Noted: 2024-01-01

## 2024-05-24 PROBLEM — L89.151 PRESSURE INJURY OF SACRAL REGION, STAGE 1: Status: ACTIVE | Noted: 2024-01-01

## 2024-05-24 PROBLEM — K59.03 DRUG INDUCED CONSTIPATION: Status: ACTIVE | Noted: 2024-01-01

## 2024-06-10 PROBLEM — N17.9 AKI (ACUTE KIDNEY INJURY): Status: RESOLVED | Noted: 2024-01-01 | Resolved: 2024-01-01
